# Patient Record
Sex: MALE | URBAN - METROPOLITAN AREA
[De-identification: names, ages, dates, MRNs, and addresses within clinical notes are randomized per-mention and may not be internally consistent; named-entity substitution may affect disease eponyms.]

---

## 2017-02-10 ENCOUNTER — PRE VISIT (OUTPATIENT)
Dept: ORTHOPEDICS | Facility: CLINIC | Age: 57
End: 2017-02-10

## 2017-02-10 NOTE — TELEPHONE ENCOUNTER
1.  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  Referring provider: TACO BOUDREAUX   3.  Call to patient (Yes / No - short description): no, records received   4.  Previous care at / records requested from: [facility]  5.  Other: Records received from [facility], will forward to clinic.  Office notes on 1/26/17, 1/16/17, 1/12/17, 1/30/17, 7/8/15, 6/23/15    Missing:   Surgery in 2010 by Dr. Jaramillo, injections, imaging

## 2017-02-15 NOTE — TELEPHONE ENCOUNTER
Additional records received from [facility], will forward to clinic. Imaging disc is being mailed.   Included:  MR right ankle on 1/10/17  Op-note on 9/8/10

## 2017-03-20 DIAGNOSIS — M25.571 PAIN IN JOINT, ANKLE AND FOOT, RIGHT: Primary | ICD-10-CM

## 2017-03-28 ENCOUNTER — OFFICE VISIT (OUTPATIENT)
Dept: ORTHOPEDICS | Facility: CLINIC | Age: 57
End: 2017-03-28

## 2017-03-28 VITALS — BODY MASS INDEX: 26.28 KG/M2 | HEIGHT: 72 IN | WEIGHT: 194 LBS

## 2017-03-28 DIAGNOSIS — M79.671 RIGHT FOOT PAIN: Primary | ICD-10-CM

## 2017-03-28 NOTE — MR AVS SNAPSHOT
After Visit Summary   3/28/2017    Jarred Granda    MRN: [id number]           Patient Information     Date Of Birth          1960        Visit Information        Provider Department      3/28/2017 12:20 PM Abel Mccain MD [facility]        Today's Diagnoses     Right foot pain    -  1       Follow-ups after your visit        Who to contact     Please call your clinic at [phone] to:    Ask questions about your health    Make or cancel appointments    Discuss your medicines    Learn about your test results    Speak to your doctor   If you have compliments or concerns about an experience at your clinic, or if you wish to file a complaint, please contact [facility] Patient Relations at [phone] or email us at [email or web address]         Additional Information About Your Visit        MyChart Information     [employer] gives you secure access to your electronic health record. If you see a primary care provider, you can also send messages to your care team and make appointments. If you have questions, please call your primary care clinic.  If you do not have a primary care provider, please call [phone] and they will assist you.      [employer] is an electronic gateway that provides easy, online access to your medical records. With [employer], you can request a clinic appointment, read your test results, renew a prescription or communicate with your care team.     To access your existing account, please contact your [facility] or call [phone] for assistance.        Care EveryWhere ID     This is your Care EveryWhere ID. This could be used by other organizations to access your [place] medical records  [id number]        Your Vitals Were     Height BMI (Body Mass Index)                1.829 m (6') 26.31 kg/m2           Blood Pressure from Last 3 Encounters:   No data found for BP    Weight from  Last 3 Encounters:   03/28/17 88 kg (194 lb)               Primary Care Provider    None Specified       No primary provider on file.        Thank you!     Thank you for choosing [facility]  for your care. Our goal is always to provide you with excellent care. Hearing back from our patients is one way we can continue to improve our services. Please take a few minutes to complete the written survey that you may receive in the mail after your visit with us. Thank you!             Your Updated Medication List - Protect others around you: Learn how to safely use, store and throw away your medicines at www.disposemymeds.org.      Notice  As of 3/28/2017 11:59 PM    You have not been prescribed any medications.

## 2017-03-28 NOTE — NURSING NOTE
Reason For Visit:   Chief Complaint   Patient presents with     Musculoskeletal Problem     R ankle pain for about a year.            Pain Assessment  Patient Currently in Pain: Yes  0-10 Pain Scale: 3  Primary Pain Location: Ankle  Pain Orientation: Right  Pain Descriptors: Numbness, Sharp  Alleviating Factors: Rest  Aggravating Factors: Movement, Walking     Ht 1.829 m (6')  Wt 88 kg (194 lb)  BMI 26.31 kg/m2

## 2017-03-28 NOTE — PROGRESS NOTES
CHIEF COMPLAINT:  Right foot pain.      HISTORY OF PRESENT ILLNESS:  Mr. Granda is a 56-year-old male who presents today for evaluation of his right foot.  The patient reports to have a history of pain and discomfort along his hindfoot area.  The patient has been told to have some tendinitis as well as some damage to the distal tibia which is the source of his pain.  He presents today for discussion of treatment options.      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        The patient also brings a history of some chronic instability.  He has undergone what seems to be bilateral calcaneus osteotomy secondary to improve his alignment.      He reports to have pain and discomfort along the lateral aspect of the ankle joint.  This is aggravated by activities and improved by rest.  The discomfort seems to be located along the tip of the lateral malleolus.      An MRI has been obtained and is available today for review.      He reports otherwise to be fairly busy and active with outdoor activities.      PAST MEDICAL HISTORY:  None.      PAST SURGICAL HISTORY:  Relates to the feet surgery as mentioned above.        DRUG ALLERGIES:  Morphine.       CURRENT MEDICATIONS:  None.      PHYSICAL EXAMINATION:  On today's visit, he presents as a pleasant male in no apparent distress with a height of 6 feet and a weight of 194 pounds.  Denies to have any constitutional symptoms.      On today's visit, he presents with full range of motion of the ankle, hindfoot and midfoot joints.  CMS is intact.  Skin intact.  Presents with pain with palpation of the peroneal tendons but seems to be the most painful area to be located along the most posterior portion of the lateral aspect of the subtalar joint.  The sinus tarsi is nontender.  The subtalar joint anterior to the lateral malleolus is nontender either.      IMAGING:  An MRI was reviewed today which was significant  for showing a full-thickness split tear of the peroneus brevis tendon.  The MRI is difficult to read secondary to the artifact from the staple located along his calcaneus tuberosity.  I could visualize also some small chondral defect along the most posterior portion of the distal tibia which I do not think has any consequences for him or any indications to his current symptoms.      ASSESSMENT:  Right hindfoot lateral pain, possible peroneal tendinosis versus subtalar joint pathology.      PLAN:  I discussed with the patient that at this point I would like to proceed with a CT scan of the right hindfoot to understand how much of the discomfort is coming from his peroneal tendons versus from the joint.      Once the CT scan is available for review, we will recommended him to proceed with the next maneuver which most likely will entail either a subtalar joint injection versus peroneal tendon sheath injection for diagnostic purposes.  Based on the results from that injection, we will determine the best treatment option for him to improve.      All questions were answered.  The patient was pleased with the discussion.  The patient will follow up accordingly.  He has no activity restrictions.      TT 30 minutes, CT 20 minutes.

## 2017-03-28 NOTE — LETTER
3/28/2017       RE: Jarred Granda  [address] [facility] [address]     Dear Colleague,    Thank you for referring your patient, Jarred Granda, to the [facility] at [facility]. Please see a copy of my visit note below.    CHIEF COMPLAINT:  Right foot pain.      HISTORY OF PRESENT ILLNESS:  Mr. Granda is a 56-year-old male who presents today for evaluation of his right foot.  The patient reports to have a history of pain and discomfort along his hindfoot area.  The patient has been told to have some tendinitis as well as some damage to the distal tibia which is the source of his pain.  He presents today for discussion of treatment options.      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        The patient also brings a history of some chronic instability.  He has undergone what seems to be bilateral calcaneus osteotomy secondary to improve his alignment.      He reports to have pain and discomfort along the lateral aspect of the ankle joint.  This is aggravated by activities and improved by rest.  The discomfort seems to be located along the tip of the lateral malleolus.      An MRI has been obtained and is available today for review.      He reports otherwise to be fairly busy and active with outdoor activities.      PAST MEDICAL HISTORY:  None.      PAST SURGICAL HISTORY:  Relates to the feet surgery as mentioned above.        DRUG ALLERGIES:  Morphine.       CURRENT MEDICATIONS:  None.      PHYSICAL EXAMINATION:  On today's visit, he presents as a pleasant male in no apparent distress with a height of 6 feet and a weight of 194 pounds.  Denies to have any constitutional symptoms.      On today's visit, he presents with full range of motion of the ankle, hindfoot and midfoot joints.  CMS is intact.  Skin intact.  Presents with pain with palpation of the peroneal tendons but seems to be  the most painful area to be located along the most posterior portion of the lateral aspect of the subtalar joint.  The sinus tarsi is nontender.  The subtalar joint anterior to the lateral malleolus is nontender either.      IMAGING:  An MRI was reviewed today which was significant for showing a full-thickness split tear of the peroneus brevis tendon.  The MRI is difficult to read secondary to the artifact from the staple located along his calcaneus tuberosity.  I could visualize also some small chondral defect along the most posterior portion of the distal tibia which I do not think has any consequences for him or any indications to his current symptoms.      ASSESSMENT:  Right hindfoot lateral pain, possible peroneal tendinosis versus subtalar joint pathology.      PLAN:  I discussed with the patient that at this point I would like to proceed with a CT scan of the right hindfoot to understand how much of the discomfort is coming from his peroneal tendons versus from the joint.      Once the CT scan is available for review, we will recommended him to proceed with the next maneuver which most likely will entail either a subtalar joint injection versus peroneal tendon sheath injection for diagnostic purposes.  Based on the results from that injection, we will determine the best treatment option for him to improve.      All questions were answered.  The patient was pleased with the discussion.  The patient will follow up accordingly.  He has no activity restrictions.      TT 30 minutes, CT 20 minutes.         Again, thank you for allowing me to participate in the care of your patient.      Sincerely,    Abel Mccain MD

## 2017-04-04 ENCOUNTER — TELEPHONE (OUTPATIENT)
Dept: ORTHOPEDICS | Facility: CLINIC | Age: 57
End: 2017-04-04

## 2017-04-04 NOTE — TELEPHONE ENCOUNTER
Talked with Jarred about the results of his CT scan. Dr. Mccain ordered an injection for him. The order was placed and faxed to his local. He was told to give the injection up to 2 weeks to take effect. He will contact us if he needs to see us.

## 2020-03-10 ENCOUNTER — HEALTH MAINTENANCE LETTER (OUTPATIENT)
Age: 60
End: 2020-03-10

## 2020-12-27 ENCOUNTER — HEALTH MAINTENANCE LETTER (OUTPATIENT)
Age: 60
End: 2020-12-27

## 2021-04-24 ENCOUNTER — HEALTH MAINTENANCE LETTER (OUTPATIENT)
Age: 61
End: 2021-04-24

## 2021-10-09 ENCOUNTER — HEALTH MAINTENANCE LETTER (OUTPATIENT)
Age: 61
End: 2021-10-09

## 2022-05-16 ENCOUNTER — HEALTH MAINTENANCE LETTER (OUTPATIENT)
Age: 62
End: 2022-05-16

## 2022-09-11 ENCOUNTER — HEALTH MAINTENANCE LETTER (OUTPATIENT)
Age: 62
End: 2022-09-11

## 2023-06-03 ENCOUNTER — HEALTH MAINTENANCE LETTER (OUTPATIENT)
Age: 63
End: 2023-06-03